# Patient Record
Sex: FEMALE | Race: WHITE | NOT HISPANIC OR LATINO | Employment: OTHER | URBAN - METROPOLITAN AREA
[De-identification: names, ages, dates, MRNs, and addresses within clinical notes are randomized per-mention and may not be internally consistent; named-entity substitution may affect disease eponyms.]

---

## 2017-04-11 ENCOUNTER — TELEPHONE (OUTPATIENT)
Dept: NEUROLOGY | Facility: HOSPITAL | Age: 72
End: 2017-04-11

## 2017-04-11 NOTE — TELEPHONE ENCOUNTER
----- Message from Pily Whitten sent at 4/10/2017  4:11 PM CDT -----  Contact: Patient  EAW/New patient - Who called: Patient    Referred by: Self    Why do you need to be seen?  Neuroendocrine -Vipoma     Which doctor are you requesting? Jose      You may contact patient back to schedule at: [phone]    Instructed patient to gather medical records, Cd's and medication list and fax or mail to us asap.

## 2017-04-11 NOTE — TELEPHONE ENCOUNTER
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  Pt recently had colonscopy which she reports as neg.  Pt has abd pain and diarrhea.  She has started on sandostatin per Dr. Pau Charles at [place], [place]. Started yrs ago with diarrhea > 20 x /day.All scans neg per pt report.  Instructed to send tumor markers, last set of scans and last MD note for review and will discuss with our physicians for next steps. Instructed her to call us when she sends the info.

## 2022-07-18 PROBLEM — N39.0 RECURRENT UTI: Status: ACTIVE | Noted: 2022-07-18

## 2022-07-18 PROBLEM — N39.41 URGE INCONTINENCE: Status: ACTIVE | Noted: 2022-07-18
